# Patient Record
Sex: MALE | Race: BLACK OR AFRICAN AMERICAN | URBAN - METROPOLITAN AREA
[De-identification: names, ages, dates, MRNs, and addresses within clinical notes are randomized per-mention and may not be internally consistent; named-entity substitution may affect disease eponyms.]

---

## 2019-07-11 ENCOUNTER — OFFICE VISIT (OUTPATIENT)
Dept: FAMILY MEDICINE | Facility: CLINIC | Age: 59
End: 2019-07-11
Payer: COMMERCIAL

## 2019-07-11 VITALS — SYSTOLIC BLOOD PRESSURE: 151 MMHG | DIASTOLIC BLOOD PRESSURE: 89 MMHG | TEMPERATURE: 98.1 F | HEART RATE: 82 BPM

## 2019-07-11 DIAGNOSIS — Z71.84 TRAVEL ADVICE ENCOUNTER: Primary | ICD-10-CM

## 2019-07-11 PROCEDURE — 90691 TYPHOID VACCINE IM: CPT | Mod: GA | Performed by: NURSE PRACTITIONER

## 2019-07-11 PROCEDURE — 90471 IMMUNIZATION ADMIN: CPT | Mod: GA | Performed by: NURSE PRACTITIONER

## 2019-07-11 PROCEDURE — 99402 PREV MED CNSL INDIV APPRX 30: CPT | Mod: 25 | Performed by: NURSE PRACTITIONER

## 2019-07-11 PROCEDURE — 90632 HEPA VACCINE ADULT IM: CPT | Mod: GA | Performed by: NURSE PRACTITIONER

## 2019-07-11 PROCEDURE — 90472 IMMUNIZATION ADMIN EACH ADD: CPT | Mod: GA | Performed by: NURSE PRACTITIONER

## 2019-07-11 RX ORDER — LISINOPRIL 10 MG/1
10 TABLET ORAL
COMMUNITY
Start: 2019-04-04

## 2019-07-11 RX ORDER — ATOVAQUONE AND PROGUANIL HYDROCHLORIDE 250; 100 MG/1; MG/1
1 TABLET, FILM COATED ORAL DAILY
Qty: 40 TABLET | Refills: 0 | Status: SHIPPED | OUTPATIENT
Start: 2019-07-11

## 2019-07-11 RX ORDER — AZITHROMYCIN 500 MG/1
500 TABLET, FILM COATED ORAL DAILY
Qty: 3 TABLET | Refills: 0 | Status: SHIPPED | OUTPATIENT
Start: 2019-07-11 | End: 2019-07-14

## 2019-07-11 NOTE — PATIENT INSTRUCTIONS
Today July 11, 2019 you received the    Hepatitis A Vaccine - Please return on 1/7/20 or later for your 2nd and final dose.    Typhoid - injectable. This vaccine is valid for two years.   .    These appointments can be made as a NURSE ONLY visit.    **It is very important for the vaccinations to be given on the scheduled day(s), this helps ensure you receive the full effectiveness of the vaccine.**    Please call [facility] with any questions [phone]    Thank you for visiting [facility]

## 2019-07-11 NOTE — PROGRESS NOTES
Nurse Note      Itinerary:  [facility]       Departure Date: 7/22/2019       Return Date: 8/22/2019      Length of Trip 1 month      Reason for Travel: Visiting friends and relatives           Urban or rural: Both      Accommodations: Family home        IMMUNIZATION HISTORY  Have you received any immunizations within the past 4 weeks?  No  Have you ever fainted from having your blood drawn or from an injection?  No  Have you ever had a fever reaction to vaccination?  No  Have you ever had any bad reaction or side effect from any vaccination?  No  Have you ever had hepatitis A or B vaccine?  No  Do you live (or work closely) with anyone who has AIDS, an AIDS-like condition, any other immune disorder or who is on chemotherapy for cancer?  No  Do you have a family history of immunodeficiency?  No  Have you received any injection of immune globulin or any blood products during the past 12 months?  No    Patient roomed by CHANDA Kruegermaritza Estrada is a 58 year old male seen today alone for counsultation for international travel to hospitals for Visiting friends and relatives.  Patient will be departing in  2 week(s) and staying for   1 month(s) and  traveling alone.      Patient itinerary :  will be in the [facility] region of [facility] which presents risk for Malaria and Yellow Fever. exposure.      Patient's activities will include visiting friends and relatives.    Patient's country of birth is [facility], arrival to  1984    Special medical concerns: type2 daibetes  Pre-travel questionnaire was completed by patient and reviewed by provider.     Vitals: /89   Pulse 82   Temp 98.1  F (36.7  C) (Oral)   BMI= There is no height or weight on file to calculate BMI.    EXAM:  General:  Well-nourished, well-developed in no acute distress.  Appears to be stated age, interacts appropriately and expresses understanding of information given to patient.    Current Outpatient Medications   Medication  Sig Dispense Refill     lisinopril (PRINIVIL/ZESTRIL) 10 MG tablet Take 10 mg by mouth       metFORMIN (GLUCOPHAGE) 500 MG tablet Take 500 mg by mouth       Patient Active Problem List   Diagnosis   (none) - all problems resolved or deleted     No Known Allergies      Immunizations discussed include:   Is a Phlebotomist with [employer]  Hepatitis A:  Ordered/given today, risks, benefits and side effects reviewed  Hepatitis B: Up to date by report  Influenza: Up to date  Typhoid: Ordered/given today, risks, benefits and side effects reviewed  Rabies: Declined  reviewed managment of a animal bite or scratch (washing wound, seek medical care within 24 hours for post exposure prophylaxis )  Yellow Fever: Up to date  Called [facility] today to get lot number of YF vaccine.  New card issued today.   Malay Encephalitis: Not indicated  Meningococcus: Declined  Not concerned about risk of disease  Tetanus/Diphtheria: Up to date  Measles/Mumps/Rubella: Up to date per employment  Cholera: Not needed  Polio: Up to date  Pneumococcal: Under age of 65 / declined  Varicella: Immune by disease history per patient report  Zostavax:  Not indicated  Shingrix: deferred  HPV:  Not indicated  TB:  Deferred gets tested at work CXR normal    Altitude Exposure on this trip: no  Past tolerance to Altitude: na    ASSESSMENT/PLAN:    ICD-10-CM    1. Travel advice encounter Z71.89 azithromycin (ZITHROMAX) 500 MG tablet     atovaquone-proguanil (MALARONE) 250-100 MG tablet     I have reviewed general recommendations for safe travel   including: food/water precautions, insect precautions, safer sex   practices given high prevalence of Zika, HIV and other STDs,   roadway safety. Educational materials and [employer] report provided.    Malaraia prophylaxis recommended: Malarone  Symptomatic treatment for traveler's diarrhea: azithromycin  Altitude illness prevention and treatment: na      Evacuation insurance advised and resources were  provided to patient.    Total visit time 30 minutes  with over 50% of time spent counseling patient as detailed above.    Kia Pool CNP

## 2019-07-12 ENCOUNTER — TELEPHONE (OUTPATIENT)
Dept: FAMILY MEDICINE | Facility: CLINIC | Age: 59
End: 2019-07-12

## 2019-07-12 NOTE — TELEPHONE ENCOUNTER
Reason for call:  Patient reporting a symptom    Symptom or request: Got shots for tavel yesterday this morning he tested his bloodsugar and it was 212 patient said it's not been this high. Will the shots effect his sugar levels? Please call    Duration (how long have symptoms been present):    Have you been treated for this before? Yes    Additional comments:    Phone Number patient can be reached at:  Home number on file [phone] (home)    Best Time: any    Can we leave a detailed message on this number:  YES    Call taken on 7/12/2019 at 9:40 AM by Tatyana Crawford

## 2019-07-12 NOTE — TELEPHONE ENCOUNTER
PN,  Please advise in Ely's absence  See below - pt's blood sugar is high today   Pt seen for travel yesterday   Received Typhoid and Hep A injections  Reviewed [facility] - unable to find info regarding side effects for diabetics  High BS not listed as a common side effect  Please advise  Thanks,  Cyndi LABOY, RN